# Patient Record
Sex: FEMALE | Race: WHITE | NOT HISPANIC OR LATINO | Employment: UNEMPLOYED | URBAN - NONMETROPOLITAN AREA
[De-identification: names, ages, dates, MRNs, and addresses within clinical notes are randomized per-mention and may not be internally consistent; named-entity substitution may affect disease eponyms.]

---
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
## 2023-02-13 ENCOUNTER — TELEPHONE (OUTPATIENT)
Dept: FAMILY MEDICINE CLINIC | Facility: CLINIC | Age: 18
End: 2023-02-13
Payer: COMMERCIAL

## 2023-02-13 NOTE — TELEPHONE ENCOUNTER
Contacted the patients grandmother per verbal release form and informed her that the patient is in need of an appointment and is due for fasting labs. She stated understanding and thanked me. Appointment has been scheduled. Lab orders extended.

## 2023-02-22 ENCOUNTER — OFFICE VISIT (OUTPATIENT)
Dept: FAMILY MEDICINE CLINIC | Facility: CLINIC | Age: 18
End: 2023-02-22
Payer: COMMERCIAL

## 2023-02-22 VITALS
WEIGHT: 252.8 LBS | DIASTOLIC BLOOD PRESSURE: 80 MMHG | BODY MASS INDEX: 50.96 KG/M2 | HEART RATE: 104 BPM | HEIGHT: 59 IN | OXYGEN SATURATION: 97 % | TEMPERATURE: 97.6 F | SYSTOLIC BLOOD PRESSURE: 122 MMHG

## 2023-02-22 DIAGNOSIS — Z11.59 NEED FOR HEPATITIS C SCREENING TEST: ICD-10-CM

## 2023-02-22 DIAGNOSIS — G44.219 EPISODIC TENSION-TYPE HEADACHE, NOT INTRACTABLE: Primary | Chronic | ICD-10-CM

## 2023-02-22 DIAGNOSIS — R11.0 NAUSEA: ICD-10-CM

## 2023-02-22 DIAGNOSIS — E66.01 CLASS 3 SEVERE OBESITY DUE TO EXCESS CALORIES WITHOUT SERIOUS COMORBIDITY WITH BODY MASS INDEX (BMI) OF 50.0 TO 59.9 IN ADULT: Chronic | ICD-10-CM

## 2023-02-22 DIAGNOSIS — R10.11 RUQ PAIN: ICD-10-CM

## 2023-02-22 DIAGNOSIS — K21.9 GASTROESOPHAGEAL REFLUX DISEASE, UNSPECIFIED WHETHER ESOPHAGITIS PRESENT: Chronic | ICD-10-CM

## 2023-02-22 PROCEDURE — 99214 OFFICE O/P EST MOD 30 MIN: CPT | Performed by: NURSE PRACTITIONER

## 2023-02-22 NOTE — PROGRESS NOTES
"Chief Complaint  Labs Only (Follow up on labs. Patient has not had labs drawn yet.)    Subjective        Kathy Urban presents to [facility] [place]  History of Present Illness  Pt here today with family member for recheck of migraines, gerd. States these are well controlled with medicine. States she takes flexeril and ibuprofen prn headache which do help. Reports she is having headaches about 3 times per week or less. States she is not currently working or going to school therefore she is able to rest in a dark room when she does have a headache which also helps. She reports she takes protonix prn which also helps when she takes it. She reports she does not need on a regular basis however. She is stating that her headaches are no different or worse than they always are. She is reporting RUQ pain and nausea after eating. This has been going on for quite a while. Has had gallbladder US for similar symptoms in the past , this was in 2019 and results are reviewed. Denies changes in bowels, denies vomiting.     Objective   Vital Signs:  /80   Pulse 104   Temp 97.6 °F (36.4 °C)   Ht 149.9 cm (59\")   Wt 115 kg (252 lb 12.8 oz)   SpO2 97%   BMI 51.06 kg/m²   Estimated body mass index is 51.06 kg/m² as calculated from the following:    Height as of this encounter: 149.9 cm (59\").    Weight as of this encounter: 115 kg (252 lb 12.8 oz).  >99 %ile (Z= 2.46) based on CDC (Girls, 2-20 Years) BMI-for-age based on BMI available as of 2/22/2023.    Class 3 Severe Obesity (BMI >=40). Obesity-related health conditions include the following: none. Obesity is worsening. BMI is is above average; BMI management plan is completed. We discussed portion control and increasing exercise.    Physical Exam  Vitals and nursing note reviewed.   Constitutional:       General: She is not in acute distress.     Appearance: Normal appearance. She is not ill-appearing, toxic-appearing or " diaphoretic.   HENT:      Head: Normocephalic and atraumatic.   Cardiovascular:      Rate and Rhythm: Normal rate and regular rhythm.      Heart sounds: Normal heart sounds. No murmur heard.    No friction rub. No gallop.   Pulmonary:      Effort: Pulmonary effort is normal. No respiratory distress.      Breath sounds: Normal breath sounds. No stridor. No wheezing, rhonchi or rales.   Chest:      Chest wall: No tenderness.   Abdominal:      General: Bowel sounds are normal. There is no distension.      Palpations: Abdomen is soft. There is no mass.      Tenderness: There is abdominal tenderness. There is no guarding.      Hernia: No hernia is present.      Comments: Rounded with obesity, exam limited due to pt habitus, slightly tender to palpation throughout but mainly to RUQ   Skin:     General: Skin is warm and dry.      Coloration: Skin is not jaundiced or pale.      Findings: No bruising, erythema, lesion or rash.   Neurological:      Mental Status: She is alert and oriented to person, place, and time.      Cranial Nerves: No cranial nerve deficit.      Motor: No weakness.      Coordination: Coordination normal.      Gait: Gait normal.   Psychiatric:         Mood and Affect: Mood normal.         Behavior: Behavior normal.         Thought Content: Thought content normal.         Judgment: Judgment normal.        Result Review :    CMP    CMP 7/27/22   Glucose 169 (A)   BUN 8   Creatinine 0.66   eGFR 130.6   Sodium 139   Potassium 4.0   Chloride 108   Calcium 9.0   Total Protein 7.5   Albumin 4.20   Globulin 3.3   Total Bilirubin 0.3   Alkaline Phosphatase 62   AST (SGOT) 36   ALT (SGPT) 38 (A)   Albumin/Globulin Ratio 1.3   BUN/Creatinine Ratio 12.1   Anion Gap 10.0   (A) Abnormal value       Comments are available for some flowsheets but are not being displayed.           CBC    CBC 7/27/22   WBC 7.94   RBC 4.75   Hemoglobin 13.7   Hematocrit 41.6   MCV 87.6   MCH 28.8   MCHC 32.9   RDW 13.5   Platelets 321            CBC w/diff    CBC w/Diff 7/27/22   WBC 7.94   RBC 4.75   Hemoglobin 13.7   Hematocrit 41.6   MCV 87.6   MCH 28.8   MCHC 32.9   RDW 13.5   Platelets 321   Neutrophil Rel % 51.1   Lymphocyte Rel % 34.3   Monocyte Rel % 6.3   Eosinophil Rel % 7.7 (A)   Basophil Rel % 0.6   (A) Abnormal value            TSH    TSH 7/27/22   TSH 2.250                    Assessment and Plan   Diagnoses and all orders for this visit:    1. Episodic tension-type headache, not intractable (Primary)    2. Need for hepatitis C screening test  -     Hepatitis C Antibody; Future    3. RUQ pain  -     US Gallbladder    4. Nausea  -     US Gallbladder    5. Gastroesophageal reflux disease, unspecified whether esophagitis present    6. Class 3 severe obesity due to excess calories without serious comorbidity with body mass index (BMI) of 50.0 to 59.9 in adult (HCC)    She will continue on Flexeril ibuprofen and Protonix as prescribed previously, I will obtain hepatitis C antibody since she is due for this with her next set of labs.  Labs have already been ordered for her and she is advised that she can have these done when fasting and will be informed of results.  I will also obtain gallbladder ultrasound on her, inform of results via phone, refer to general surgery as indicated depending on above test results.  She will otherwise return in 6 months for recheck, as needed otherwise.  All questions and concerns addressed with understanding verbalized.  They are aware and in agreement to this plan.  She does not need a work or school excuse today.     I spent 30 minutes caring for Kathy on this date of service. This time includes time spent by me in the following activities:preparing for the visit, performing a medically appropriate examination and/or evaluation , counseling and educating the patient/family/caregiver, ordering medications, tests, or procedures and documenting information in the medical record  Follow Up   Return in about 6  months (around 8/22/2023), or if symptoms worsen or fail to improve, for Recheck, or sooner as needed.  Patient was given instructions and counseling regarding her condition or for health maintenance advice. Please see specific information pulled into the AVS if appropriate.

## 2023-02-27 ENCOUNTER — LAB (OUTPATIENT)
Dept: LAB | Facility: OTHER | Age: 18
End: 2023-02-27
Payer: COMMERCIAL

## 2023-02-27 DIAGNOSIS — E78.00 ELEVATED CHOLESTEROL: ICD-10-CM

## 2023-02-27 DIAGNOSIS — R53.83 FATIGUE, UNSPECIFIED TYPE: ICD-10-CM

## 2023-02-27 DIAGNOSIS — R73.09 ELEVATED GLUCOSE: ICD-10-CM

## 2023-02-27 DIAGNOSIS — Z11.59 NEED FOR HEPATITIS C SCREENING TEST: ICD-10-CM

## 2023-02-27 DIAGNOSIS — E61.1 LOW IRON: ICD-10-CM

## 2023-02-27 LAB
ALBUMIN SERPL-MCNC: 4.1 G/DL (ref 3.5–5)
ALBUMIN/GLOB SERPL: 1.1 G/DL (ref 1.1–1.8)
ALP SERPL-CCNC: 53 U/L (ref 38–126)
ALT SERPL W P-5'-P-CCNC: 42 U/L
ANION GAP SERPL CALCULATED.3IONS-SCNC: 7 MMOL/L (ref 5–15)
AST SERPL-CCNC: 33 U/L (ref 14–36)
BILIRUB SERPL-MCNC: 0.5 MG/DL (ref 0.2–1.3)
BUN SERPL-MCNC: 11 MG/DL (ref 7–23)
BUN/CREAT SERPL: 16.9 (ref 7–25)
CALCIUM SPEC-SCNC: 9 MG/DL (ref 8.4–10.2)
CHLORIDE SERPL-SCNC: 104 MMOL/L (ref 101–112)
CHOLEST SERPL-MCNC: 189 MG/DL (ref 150–200)
CO2 SERPL-SCNC: 26 MMOL/L (ref 22–30)
CREAT SERPL-MCNC: 0.65 MG/DL (ref 0.52–1.04)
EGFRCR SERPLBLD CKD-EPI 2021: 130.3 ML/MIN/1.73
GLOBULIN UR ELPH-MCNC: 3.7 GM/DL (ref 2.3–3.5)
GLUCOSE SERPL-MCNC: 97 MG/DL (ref 70–99)
HBA1C MFR BLD: 6.2 % (ref 4.8–5.6)
HCV AB SER DONR QL: NORMAL
HDLC SERPL-MCNC: 44 MG/DL (ref 40–59)
IRON 24H UR-MRATE: 61 MCG/DL (ref 37–145)
IRON SATN MFR SERPL: 14 % (ref 20–50)
LDLC SERPL CALC-MCNC: 103 MG/DL
LDLC/HDLC SERPL: 2.17 {RATIO} (ref 0–3.22)
POTASSIUM SERPL-SCNC: 3.9 MMOL/L (ref 3.4–5)
PROT SERPL-MCNC: 7.8 G/DL (ref 6.3–8.6)
SODIUM SERPL-SCNC: 137 MMOL/L (ref 137–145)
TIBC SERPL-MCNC: 440 MCG/DL (ref 298–536)
TRANSFERRIN SERPL-MCNC: 295 MG/DL (ref 200–360)
TRIGL SERPL-MCNC: 247 MG/DL
VIT B12 BLD-MCNC: 360 PG/ML (ref 211–946)
VLDLC SERPL-MCNC: 42 MG/DL (ref 5–40)

## 2023-02-27 PROCEDURE — 82607 VITAMIN B-12: CPT | Performed by: NURSE PRACTITIONER

## 2023-02-27 PROCEDURE — 80061 LIPID PANEL: CPT | Performed by: NURSE PRACTITIONER

## 2023-02-27 PROCEDURE — 86803 HEPATITIS C AB TEST: CPT | Performed by: NURSE PRACTITIONER

## 2023-02-27 PROCEDURE — 83525 ASSAY OF INSULIN: CPT | Performed by: NURSE PRACTITIONER

## 2023-02-27 PROCEDURE — 83036 HEMOGLOBIN GLYCOSYLATED A1C: CPT | Performed by: NURSE PRACTITIONER

## 2023-02-27 PROCEDURE — 80053 COMPREHEN METABOLIC PANEL: CPT | Performed by: NURSE PRACTITIONER

## 2023-02-27 PROCEDURE — 83540 ASSAY OF IRON: CPT | Performed by: NURSE PRACTITIONER

## 2023-02-27 PROCEDURE — 84466 ASSAY OF TRANSFERRIN: CPT | Performed by: NURSE PRACTITIONER

## 2023-02-28 LAB — INSULIN SERPL-ACNC: 52.7 UIU/ML (ref 2.6–24.9)

## 2023-03-06 DIAGNOSIS — R11.0 NAUSEA: ICD-10-CM

## 2023-03-06 DIAGNOSIS — R10.11 RUQ PAIN: Primary | ICD-10-CM

## 2023-04-20 ENCOUNTER — TELEPHONE (OUTPATIENT)
Dept: FAMILY MEDICINE CLINIC | Facility: CLINIC | Age: 18
End: 2023-04-20
Payer: COMMERCIAL

## 2023-04-20 DIAGNOSIS — R10.11 RUQ PAIN: Primary | ICD-10-CM

## 2023-04-20 NOTE — TELEPHONE ENCOUNTER
Informed patient of HIDA scan results. Patient states understanding, she is still having abdominal pain and wants to be referred to surgeon for further evaluation and also scheduled to see reny next week.

## 2023-04-21 DIAGNOSIS — R10.11 RUQ PAIN: ICD-10-CM

## 2023-04-21 DIAGNOSIS — R11.0 NAUSEA: ICD-10-CM

## 2023-04-24 ENCOUNTER — OFFICE VISIT (OUTPATIENT)
Dept: FAMILY MEDICINE CLINIC | Facility: CLINIC | Age: 18
End: 2023-04-24
Payer: COMMERCIAL

## 2023-04-24 VITALS
BODY MASS INDEX: 50.16 KG/M2 | HEART RATE: 131 BPM | DIASTOLIC BLOOD PRESSURE: 74 MMHG | OXYGEN SATURATION: 98 % | HEIGHT: 59 IN | SYSTOLIC BLOOD PRESSURE: 130 MMHG | WEIGHT: 248.8 LBS | TEMPERATURE: 98 F

## 2023-04-24 DIAGNOSIS — R11.2 NAUSEA AND VOMITING, UNSPECIFIED VOMITING TYPE: ICD-10-CM

## 2023-04-24 DIAGNOSIS — R10.11 RUQ PAIN: Primary | ICD-10-CM

## 2023-04-24 PROCEDURE — 1160F RVW MEDS BY RX/DR IN RCRD: CPT | Performed by: NURSE PRACTITIONER

## 2023-04-24 PROCEDURE — 1159F MED LIST DOCD IN RCRD: CPT | Performed by: NURSE PRACTITIONER

## 2023-04-24 PROCEDURE — 99213 OFFICE O/P EST LOW 20 MIN: CPT | Performed by: NURSE PRACTITIONER

## 2023-04-24 NOTE — PROGRESS NOTES
"Chief Complaint  Abdominal Pain (Follow up on stomach pains)    Subjective        Kathy Urban presents to [facility] [place]  History of Present Illness  Patient here today again with complaints of stomach pain sharp and stomach cramps mainly after she eats she states that she will have diarrhea decreased appetite nausea and vomiting.  Nausea and vomiting are constant diarrhea and decreased appetite worse after eating.  Symptoms are becoming more regular.  She has had ultrasound of gallbladder and HIDA scan and is awaiting appointment with surgeon now.  Unfortunately she has not yet received appointment date and time but we are going to check on that today.  Reports menses are regular and she is not sexually active.  Objective   Vital Signs:  /74   Pulse (!) 131   Temp 98 °F (36.7 °C)   Ht 149.9 cm (59\")   Wt 113 kg (248 lb 12.8 oz)   SpO2 98%   BMI 50.25 kg/m²   Estimated body mass index is 50.25 kg/m² as calculated from the following:    Height as of this encounter: 149.9 cm (59\").    Weight as of this encounter: 113 kg (248 lb 12.8 oz).  >99 %ile (Z= 2.44) based on CDC (Girls, 2-20 Years) BMI-for-age based on BMI available as of 4/24/2023.    Class 3 Severe Obesity (BMI >=40). Obesity-related health conditions include the following: none. Obesity is improving with lifestyle modifications. BMI is is above average; BMI management plan is completed. We discussed low calorie, low carb based diet program, portion control and increasing exercise.    Physical Exam  Vitals and nursing note reviewed.   Constitutional:       General: She is not in acute distress.     Appearance: Normal appearance. She is obese. She is not ill-appearing, toxic-appearing or diaphoretic.   HENT:      Head: Normocephalic and atraumatic.   Cardiovascular:      Rate and Rhythm: Normal rate and regular rhythm.      Heart sounds: Normal heart sounds. No murmur heard.    No friction rub. " No gallop.   Pulmonary:      Effort: Pulmonary effort is normal. No respiratory distress.      Breath sounds: Normal breath sounds. No stridor. No wheezing, rhonchi or rales.   Abdominal:      General: Bowel sounds are normal. There is no distension.      Palpations: Abdomen is soft. There is no mass.      Tenderness: There is abdominal tenderness in the right upper quadrant. There is no guarding.      Hernia: No hernia is present.      Comments: abd rounded with obesity , exam limited due to pt habitus    Musculoskeletal:      Cervical back: Neck supple.   Skin:     General: Skin is warm and dry.      Coloration: Skin is not jaundiced or pale.      Findings: No bruising, erythema, lesion or rash.   Neurological:      Mental Status: She is alert and oriented to person, place, and time.      Cranial Nerves: No cranial nerve deficit.      Motor: No weakness.      Coordination: Coordination normal.      Gait: Gait normal.   Psychiatric:         Mood and Affect: Mood normal.         Behavior: Behavior normal.         Thought Content: Thought content normal.         Judgment: Judgment normal.        Result Review :      Data reviewed: Radiologic studies hida scan results reviewed today from 4-18-23         Assessment and Plan   Diagnoses and all orders for this visit:    1. RUQ pain (Primary)    2. Nausea and vomiting, unspecified vomiting type    Gallbladder ultrasound results and HIDA scan results are reviewed with her today.  Staff has contacted surgeons office and appointment made for Wednesday.  Patient aware.  If her symptoms should persist or worsen then she will call me back and let me know otherwise she will see surgeon as scheduled for consideration of cholecystectomy as he deems necessary.  All questions and concerns addressed with understand verbalized.  Patient aware and in agreement to above plan.     I spent 20 minutes caring for Kathy on this date of service. This time includes time spent by me in the  following activities:preparing for the visit, reviewing tests, performing a medically appropriate examination and/or evaluation , counseling and educating the patient/family/caregiver, referring and communicating with other health care professionals  and documenting information in the medical record  Follow Up   Return if symptoms worsen or fail to improve, for or sooner as needed, Recheck.  Patient was given instructions and counseling regarding her condition or for health maintenance advice. Please see specific information pulled into the AVS if appropriate.

## 2023-04-26 ENCOUNTER — OFFICE VISIT (OUTPATIENT)
Dept: SURGERY | Facility: CLINIC | Age: 18
End: 2023-04-26
Payer: COMMERCIAL

## 2023-04-26 VITALS
HEIGHT: 59 IN | DIASTOLIC BLOOD PRESSURE: 84 MMHG | WEIGHT: 250 LBS | HEART RATE: 98 BPM | SYSTOLIC BLOOD PRESSURE: 128 MMHG | TEMPERATURE: 97.1 F | BODY MASS INDEX: 50.4 KG/M2

## 2023-04-26 DIAGNOSIS — R19.7 DIARRHEA, UNSPECIFIED TYPE: Primary | ICD-10-CM

## 2023-04-26 PROCEDURE — 99204 OFFICE O/P NEW MOD 45 MIN: CPT | Performed by: SURGERY

## 2023-04-26 PROCEDURE — 1160F RVW MEDS BY RX/DR IN RCRD: CPT | Performed by: SURGERY

## 2023-04-26 PROCEDURE — 1159F MED LIST DOCD IN RCRD: CPT | Performed by: SURGERY
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
## 2023-05-01 DIAGNOSIS — R51.9 NONINTRACTABLE HEADACHE, UNSPECIFIED CHRONICITY PATTERN, UNSPECIFIED HEADACHE TYPE: ICD-10-CM

## 2023-05-01 DIAGNOSIS — G44.219 EPISODIC TENSION-TYPE HEADACHE, NOT INTRACTABLE: ICD-10-CM

## 2023-05-01 RX ORDER — CYCLOBENZAPRINE HCL 10 MG
10 TABLET ORAL NIGHTLY PRN
Qty: 30 TABLET | Refills: 0 | Status: SHIPPED | OUTPATIENT
Start: 2023-05-01

## 2023-05-12 ENCOUNTER — OFFICE VISIT (OUTPATIENT)
Dept: GASTROENTEROLOGY | Facility: CLINIC | Age: 18
End: 2023-05-12
Payer: COMMERCIAL

## 2023-05-12 VITALS
BODY MASS INDEX: 50.12 KG/M2 | HEART RATE: 122 BPM | SYSTOLIC BLOOD PRESSURE: 120 MMHG | HEIGHT: 59 IN | WEIGHT: 248.6 LBS | DIASTOLIC BLOOD PRESSURE: 78 MMHG

## 2023-05-12 DIAGNOSIS — K76.0 FATTY LIVER: ICD-10-CM

## 2023-05-12 DIAGNOSIS — R10.11 POSTPRANDIAL RUQ PAIN: ICD-10-CM

## 2023-05-12 DIAGNOSIS — R19.7 DIARRHEA, UNSPECIFIED TYPE: ICD-10-CM

## 2023-05-12 DIAGNOSIS — K21.9 GASTROESOPHAGEAL REFLUX DISEASE, UNSPECIFIED WHETHER ESOPHAGITIS PRESENT: Primary | ICD-10-CM

## 2023-05-12 RX ORDER — PANTOPRAZOLE SODIUM 40 MG/1
40 TABLET, DELAYED RELEASE ORAL DAILY
Qty: 30 TABLET | Refills: 5 | Status: SHIPPED | OUTPATIENT
Start: 2023-05-12

## 2023-05-12 RX ORDER — LISDEXAMFETAMINE DIMESYLATE 40 MG/1
40 CAPSULE ORAL EVERY MORNING
COMMUNITY
Start: 2023-05-01
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
## 2023-05-15 DIAGNOSIS — G44.219 EPISODIC TENSION-TYPE HEADACHE, NOT INTRACTABLE: ICD-10-CM

## 2023-05-15 DIAGNOSIS — R51.9 NONINTRACTABLE HEADACHE, UNSPECIFIED CHRONICITY PATTERN, UNSPECIFIED HEADACHE TYPE: ICD-10-CM

## 2023-05-16 RX ORDER — IBUPROFEN 600 MG/1
600 TABLET ORAL EVERY 8 HOURS PRN
Qty: 60 TABLET | Refills: 0 | Status: SHIPPED | OUTPATIENT
Start: 2023-05-16

## 2023-05-17 ENCOUNTER — OFFICE VISIT (OUTPATIENT)
Dept: SURGERY | Facility: CLINIC | Age: 18
End: 2023-05-17
Payer: COMMERCIAL

## 2023-05-17 ENCOUNTER — PRE-ADMISSION TESTING (OUTPATIENT)
Dept: PREADMISSION TESTING | Facility: HOSPITAL | Age: 18
End: 2023-05-17
Payer: COMMERCIAL

## 2023-05-17 VITALS
BODY MASS INDEX: 50.6 KG/M2 | TEMPERATURE: 96.6 F | HEIGHT: 59 IN | HEART RATE: 128 BPM | SYSTOLIC BLOOD PRESSURE: 122 MMHG | WEIGHT: 251 LBS | DIASTOLIC BLOOD PRESSURE: 78 MMHG

## 2023-05-17 DIAGNOSIS — K81.9 CHOLECYSTITIS: Primary | ICD-10-CM

## 2023-05-17 LAB
ANION GAP SERPL CALCULATED.3IONS-SCNC: 13 MMOL/L (ref 5–15)
B-HCG UR QL: NEGATIVE
BUN SERPL-MCNC: 13 MG/DL (ref 6–20)
BUN/CREAT SERPL: 17.3 (ref 7–25)
CALCIUM SPEC-SCNC: 9.1 MG/DL (ref 8.6–10.5)
CHLORIDE SERPL-SCNC: 102 MMOL/L (ref 98–107)
CO2 SERPL-SCNC: 21 MMOL/L (ref 22–29)
CREAT SERPL-MCNC: 0.75 MG/DL (ref 0.57–1)
EGFRCR SERPLBLD CKD-EPI 2021: 117.8 ML/MIN/1.73
GLUCOSE SERPL-MCNC: 158 MG/DL (ref 65–99)
POTASSIUM SERPL-SCNC: 3.9 MMOL/L (ref 3.5–5.2)
QT INTERVAL: 300 MS
QTC INTERVAL: 409 MS
SODIUM SERPL-SCNC: 136 MMOL/L (ref 136–145)

## 2023-05-17 PROCEDURE — 93005 ELECTROCARDIOGRAM TRACING: CPT

## 2023-05-17 PROCEDURE — 81025 URINE PREGNANCY TEST: CPT

## 2023-05-17 PROCEDURE — 80048 BASIC METABOLIC PNL TOTAL CA: CPT

## 2023-05-17 PROCEDURE — 36415 COLL VENOUS BLD VENIPUNCTURE: CPT

## 2023-05-17 RX ORDER — SODIUM CHLORIDE 0.9 % (FLUSH) 0.9 %
10 SYRINGE (ML) INJECTION EVERY 12 HOURS SCHEDULED
Status: CANCELLED | OUTPATIENT
Start: 2023-05-18

## 2023-05-17 RX ORDER — SODIUM CHLORIDE 9 MG/ML
40 INJECTION, SOLUTION INTRAVENOUS AS NEEDED
Status: CANCELLED | OUTPATIENT
Start: 2023-05-18

## 2023-05-17 RX ORDER — SODIUM CHLORIDE 9 MG/ML
1000 INJECTION, SOLUTION INTRAVENOUS CONTINUOUS
Status: CANCELLED | OUTPATIENT
Start: 2023-05-18

## 2023-05-17 RX ORDER — BUPIVACAINE HCL/0.9 % NACL/PF 0.1 %
2 PLASTIC BAG, INJECTION (ML) EPIDURAL ONCE
Status: CANCELLED | OUTPATIENT
Start: 2023-05-18 | End: 2023-05-17

## 2023-05-17 RX ORDER — SODIUM CHLORIDE 0.9 % (FLUSH) 0.9 %
10 SYRINGE (ML) INJECTION AS NEEDED
Status: CANCELLED | OUTPATIENT
Start: 2023-05-18

## 2023-05-17 RX ORDER — INDOCYANINE GREEN AND WATER 25 MG
5 KIT INJECTION ONCE
Status: CANCELLED | OUTPATIENT
Start: 2023-05-18 | End: 2023-05-17
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
## 2023-05-18 ENCOUNTER — HOSPITAL ENCOUNTER (OUTPATIENT)
Facility: HOSPITAL | Age: 18
Setting detail: HOSPITAL OUTPATIENT SURGERY
Discharge: HOME | End: 2023-05-18
Attending: SURGERY | Admitting: SURGERY
Payer: COMMERCIAL

## 2023-05-18 ENCOUNTER — ANESTHESIA (OUTPATIENT)
Dept: PERIOP | Facility: HOSPITAL | Age: 18
End: 2023-05-18
Payer: COMMERCIAL

## 2023-05-18 ENCOUNTER — ANESTHESIA EVENT (OUTPATIENT)
Dept: PERIOP | Facility: HOSPITAL | Age: 18
End: 2023-05-18
Payer: COMMERCIAL

## 2023-05-18 VITALS
SYSTOLIC BLOOD PRESSURE: 119 MMHG | RESPIRATION RATE: 20 BRPM | BODY MASS INDEX: 49.43 KG/M2 | HEART RATE: 109 BPM | WEIGHT: 251.77 LBS | DIASTOLIC BLOOD PRESSURE: 69 MMHG | TEMPERATURE: 96.9 F | HEIGHT: 60 IN | OXYGEN SATURATION: 100 %

## 2023-05-18 DIAGNOSIS — K81.9 CHOLECYSTITIS: ICD-10-CM

## 2023-05-18 LAB — GLUCOSE BLDC GLUCOMTR-MCNC: 120 MG/DL (ref 70–130)

## 2023-05-18 PROCEDURE — 25010000002 HYDROMORPHONE 1 MG/ML SOLUTION: Performed by: NURSE ANESTHETIST, CERTIFIED REGISTERED

## 2023-05-18 PROCEDURE — 25010000002 MIDAZOLAM PER 1 MG: Performed by: NURSE ANESTHETIST, CERTIFIED REGISTERED

## 2023-05-18 PROCEDURE — 25010000002 INDOCYANINE GREEN 25 MG RECONSTITUTED SOLUTION: Performed by: SURGERY

## 2023-05-18 PROCEDURE — 25010000002 DEXAMETHASONE PER 1 MG: Performed by: NURSE ANESTHETIST, CERTIFIED REGISTERED

## 2023-05-18 PROCEDURE — 25010000002 SUCCINYLCHOLINE PER 20 MG: Performed by: NURSE ANESTHETIST, CERTIFIED REGISTERED

## 2023-05-18 PROCEDURE — 25010000002 FENTANYL CITRATE (PF) 100 MCG/2ML SOLUTION: Performed by: NURSE ANESTHETIST, CERTIFIED REGISTERED

## 2023-05-18 PROCEDURE — 25010000002 ONDANSETRON PER 1 MG: Performed by: NURSE ANESTHETIST, CERTIFIED REGISTERED

## 2023-05-18 PROCEDURE — 94640 AIRWAY INHALATION TREATMENT: CPT

## 2023-05-18 PROCEDURE — 25010000002 PROPOFOL 200 MG/20ML EMULSION: Performed by: NURSE ANESTHETIST, CERTIFIED REGISTERED

## 2023-05-18 PROCEDURE — 25010000002 KETOROLAC TROMETHAMINE PER 15 MG: Performed by: NURSE ANESTHETIST, CERTIFIED REGISTERED

## 2023-05-18 PROCEDURE — 47562 LAPAROSCOPIC CHOLECYSTECTOMY: CPT | Performed by: SURGERY

## 2023-05-18 PROCEDURE — 82948 REAGENT STRIP/BLOOD GLUCOSE: CPT

## 2023-05-18 PROCEDURE — S2900 ROBOTIC SURGICAL SYSTEM: HCPCS | Performed by: SURGERY

## 2023-05-18 PROCEDURE — 25010000002 CEFAZOLIN PER 500 MG: Performed by: SURGERY

## 2023-05-18 DEVICE — CLIP LIG HEMOLOK PA LG 6CT PRP: Type: IMPLANTABLE DEVICE | Site: ABDOMEN | Status: FUNCTIONAL

## 2023-05-18 RX ORDER — EPHEDRINE SULFATE 50 MG/ML
5 INJECTION, SOLUTION INTRAVENOUS ONCE AS NEEDED
Status: DISCONTINUED | OUTPATIENT
Start: 2023-05-18 | End: 2023-05-18 | Stop reason: HOSPADM

## 2023-05-18 RX ORDER — ONDANSETRON 2 MG/ML
4 INJECTION INTRAMUSCULAR; INTRAVENOUS ONCE AS NEEDED
Status: DISCONTINUED | OUTPATIENT
Start: 2023-05-18 | End: 2023-05-18 | Stop reason: HOSPADM

## 2023-05-18 RX ORDER — NALOXONE HCL 0.4 MG/ML
0.4 VIAL (ML) INJECTION AS NEEDED
Status: DISCONTINUED | OUTPATIENT
Start: 2023-05-18 | End: 2023-05-18 | Stop reason: HOSPADM

## 2023-05-18 RX ORDER — ACETAMINOPHEN 325 MG/1
650 TABLET ORAL ONCE AS NEEDED
Status: DISCONTINUED | OUTPATIENT
Start: 2023-05-18 | End: 2023-05-18 | Stop reason: HOSPADM

## 2023-05-18 RX ORDER — ROCURONIUM BROMIDE 10 MG/ML
INJECTION, SOLUTION INTRAVENOUS AS NEEDED
Status: DISCONTINUED | OUTPATIENT
Start: 2023-05-18 | End: 2023-05-18 | Stop reason: SURG

## 2023-05-18 RX ORDER — BUPIVACAINE HYDROCHLORIDE AND EPINEPHRINE 2.5; 5 MG/ML; UG/ML
INJECTION, SOLUTION EPIDURAL; INFILTRATION; INTRACAUDAL; PERINEURAL AS NEEDED
Status: DISCONTINUED | OUTPATIENT
Start: 2023-05-18 | End: 2023-05-18 | Stop reason: HOSPADM

## 2023-05-18 RX ORDER — PROMETHAZINE HYDROCHLORIDE 25 MG/1
25 SUPPOSITORY RECTAL ONCE AS NEEDED
Status: DISCONTINUED | OUTPATIENT
Start: 2023-05-18 | End: 2023-05-18 | Stop reason: HOSPADM

## 2023-05-18 RX ORDER — DEXAMETHASONE SODIUM PHOSPHATE 4 MG/ML
INJECTION, SOLUTION INTRA-ARTICULAR; INTRALESIONAL; INTRAMUSCULAR; INTRAVENOUS; SOFT TISSUE AS NEEDED
Status: DISCONTINUED | OUTPATIENT
Start: 2023-05-18 | End: 2023-05-18 | Stop reason: SURG

## 2023-05-18 RX ORDER — LIDOCAINE HYDROCHLORIDE 10 MG/ML
INJECTION, SOLUTION EPIDURAL; INFILTRATION; INTRACAUDAL; PERINEURAL AS NEEDED
Status: DISCONTINUED | OUTPATIENT
Start: 2023-05-18 | End: 2023-05-18 | Stop reason: SURG

## 2023-05-18 RX ORDER — DIPHENHYDRAMINE HYDROCHLORIDE 50 MG/ML
12.5 INJECTION INTRAMUSCULAR; INTRAVENOUS
Status: DISCONTINUED | OUTPATIENT
Start: 2023-05-18 | End: 2023-05-18 | Stop reason: HOSPADM

## 2023-05-18 RX ORDER — ALBUTEROL SULFATE 2.5 MG/3ML
2.5 SOLUTION RESPIRATORY (INHALATION) ONCE
Status: COMPLETED | OUTPATIENT
Start: 2023-05-18 | End: 2023-05-18

## 2023-05-18 RX ORDER — FLUMAZENIL 0.1 MG/ML
0.2 INJECTION INTRAVENOUS AS NEEDED
Status: DISCONTINUED | OUTPATIENT
Start: 2023-05-18 | End: 2023-05-18 | Stop reason: HOSPADM

## 2023-05-18 RX ORDER — PROMETHAZINE HYDROCHLORIDE 25 MG/1
25 TABLET ORAL ONCE AS NEEDED
Status: DISCONTINUED | OUTPATIENT
Start: 2023-05-18 | End: 2023-05-18 | Stop reason: HOSPADM

## 2023-05-18 RX ORDER — SODIUM CHLORIDE 9 MG/ML
40 INJECTION, SOLUTION INTRAVENOUS AS NEEDED
Status: DISCONTINUED | OUTPATIENT
Start: 2023-05-18 | End: 2023-05-18 | Stop reason: HOSPADM

## 2023-05-18 RX ORDER — SODIUM CHLORIDE 0.9 % (FLUSH) 0.9 %
10 SYRINGE (ML) INJECTION EVERY 12 HOURS SCHEDULED
Status: DISCONTINUED | OUTPATIENT
Start: 2023-05-18 | End: 2023-05-18 | Stop reason: HOSPADM

## 2023-05-18 RX ORDER — PROPOFOL 10 MG/ML
INJECTION, EMULSION INTRAVENOUS AS NEEDED
Status: DISCONTINUED | OUTPATIENT
Start: 2023-05-18 | End: 2023-05-18 | Stop reason: SURG

## 2023-05-18 RX ORDER — SODIUM CHLORIDE 9 MG/ML
1000 INJECTION, SOLUTION INTRAVENOUS CONTINUOUS
Status: DISCONTINUED | OUTPATIENT
Start: 2023-05-18 | End: 2023-05-18 | Stop reason: HOSPADM

## 2023-05-18 RX ORDER — BUPIVACAINE HCL/0.9 % NACL/PF 0.1 %
2 PLASTIC BAG, INJECTION (ML) EPIDURAL ONCE
Status: COMPLETED | OUTPATIENT
Start: 2023-05-18 | End: 2023-05-18

## 2023-05-18 RX ORDER — ONDANSETRON 2 MG/ML
INJECTION INTRAMUSCULAR; INTRAVENOUS AS NEEDED
Status: DISCONTINUED | OUTPATIENT
Start: 2023-05-18 | End: 2023-05-18 | Stop reason: SURG

## 2023-05-18 RX ORDER — MIDAZOLAM HYDROCHLORIDE 1 MG/ML
INJECTION INTRAMUSCULAR; INTRAVENOUS AS NEEDED
Status: DISCONTINUED | OUTPATIENT
Start: 2023-05-18 | End: 2023-05-18 | Stop reason: SURG

## 2023-05-18 RX ORDER — KETOROLAC TROMETHAMINE 30 MG/ML
INJECTION, SOLUTION INTRAMUSCULAR; INTRAVENOUS AS NEEDED
Status: DISCONTINUED | OUTPATIENT
Start: 2023-05-18 | End: 2023-05-18 | Stop reason: SURG

## 2023-05-18 RX ORDER — SODIUM CHLORIDE 0.9 % (FLUSH) 0.9 %
10 SYRINGE (ML) INJECTION AS NEEDED
Status: DISCONTINUED | OUTPATIENT
Start: 2023-05-18 | End: 2023-05-18 | Stop reason: HOSPADM

## 2023-05-18 RX ORDER — ALBUTEROL SULFATE 2.5 MG/3ML
2.5 SOLUTION RESPIRATORY (INHALATION) ONCE AS NEEDED
Status: COMPLETED | OUTPATIENT
Start: 2023-05-18 | End: 2023-05-18

## 2023-05-18 RX ORDER — SUCCINYLCHOLINE CHLORIDE 20 MG/ML
INJECTION INTRAMUSCULAR; INTRAVENOUS AS NEEDED
Status: DISCONTINUED | OUTPATIENT
Start: 2023-05-18 | End: 2023-05-18 | Stop reason: SURG

## 2023-05-18 RX ORDER — INDOCYANINE GREEN AND WATER 25 MG
5 KIT INJECTION ONCE
Status: COMPLETED | OUTPATIENT
Start: 2023-05-18 | End: 2023-05-18

## 2023-05-18 RX ORDER — HYDROCODONE BITARTRATE AND ACETAMINOPHEN 5; 325 MG/1; MG/1
1 TABLET ORAL EVERY 6 HOURS PRN
Qty: 12 TABLET | Refills: 0 | Status: SHIPPED | OUTPATIENT
Start: 2023-05-18

## 2023-05-18 RX ORDER — FENTANYL CITRATE 50 UG/ML
INJECTION, SOLUTION INTRAMUSCULAR; INTRAVENOUS AS NEEDED
Status: DISCONTINUED | OUTPATIENT
Start: 2023-05-18 | End: 2023-05-18 | Stop reason: SURG

## 2023-05-18 RX ORDER — MEPERIDINE HYDROCHLORIDE 50 MG/ML
12.5 INJECTION INTRAMUSCULAR; INTRAVENOUS; SUBCUTANEOUS
Status: DISCONTINUED | OUTPATIENT
Start: 2023-05-18 | End: 2023-05-18 | Stop reason: HOSPADM

## 2023-05-18 RX ORDER — ACETAMINOPHEN 650 MG/1
650 SUPPOSITORY RECTAL ONCE AS NEEDED
Status: DISCONTINUED | OUTPATIENT
Start: 2023-05-18 | End: 2023-05-18 | Stop reason: HOSPADM

## 2023-05-18 RX ADMIN — ONDANSETRON 4 MG: 2 INJECTION INTRAMUSCULAR; INTRAVENOUS at 08:37

## 2023-05-18 RX ADMIN — SUCCINYLCHOLINE CHLORIDE 140 MG: 20 INJECTION, SOLUTION INTRAMUSCULAR; INTRAVENOUS at 07:13

## 2023-05-18 RX ADMIN — ROCURONIUM BROMIDE 5 MG: 50 INJECTION INTRAVENOUS at 07:13

## 2023-05-18 RX ADMIN — DEXAMETHASONE SODIUM PHOSPHATE 8 MG: 4 INJECTION, SOLUTION INTRAMUSCULAR; INTRAVENOUS at 07:30

## 2023-05-18 RX ADMIN — Medication 2 G: at 07:16

## 2023-05-18 RX ADMIN — ROCURONIUM BROMIDE 10 MG: 50 INJECTION INTRAVENOUS at 07:59

## 2023-05-18 RX ADMIN — HYDROMORPHONE HYDROCHLORIDE 0.4 MG: 1 INJECTION, SOLUTION INTRAMUSCULAR; INTRAVENOUS; SUBCUTANEOUS at 07:35

## 2023-05-18 RX ADMIN — ROCURONIUM BROMIDE 45 MG: 50 INJECTION INTRAVENOUS at 07:16

## 2023-05-18 RX ADMIN — ALBUTEROL SULFATE 2.5 MG: 2.5 SOLUTION RESPIRATORY (INHALATION) at 09:33

## 2023-05-18 RX ADMIN — HYDROMORPHONE HYDROCHLORIDE 0.2 MG: 1 INJECTION, SOLUTION INTRAMUSCULAR; INTRAVENOUS; SUBCUTANEOUS at 07:57

## 2023-05-18 RX ADMIN — INDOCYANINE GREEN AND WATER 5 MG: KIT at 06:19

## 2023-05-18 RX ADMIN — SODIUM CHLORIDE 1000 ML: 9 INJECTION, SOLUTION INTRAVENOUS at 06:19

## 2023-05-18 RX ADMIN — HYDROMORPHONE HYDROCHLORIDE 0.2 MG: 1 INJECTION, SOLUTION INTRAMUSCULAR; INTRAVENOUS; SUBCUTANEOUS at 08:32

## 2023-05-18 RX ADMIN — ROCURONIUM BROMIDE 10 MG: 50 INJECTION INTRAVENOUS at 07:38

## 2023-05-18 RX ADMIN — MIDAZOLAM HYDROCHLORIDE 2 MG: 1 INJECTION, SOLUTION INTRAMUSCULAR; INTRAVENOUS at 07:04

## 2023-05-18 RX ADMIN — HYDROMORPHONE HYDROCHLORIDE 0.5 MG: 1 INJECTION, SOLUTION INTRAMUSCULAR; INTRAVENOUS; SUBCUTANEOUS at 09:35

## 2023-05-18 RX ADMIN — ROCURONIUM BROMIDE 10 MG: 50 INJECTION INTRAVENOUS at 08:16

## 2023-05-18 RX ADMIN — KETOROLAC TROMETHAMINE 30 MG: 30 INJECTION, SOLUTION INTRAMUSCULAR; INTRAVENOUS at 08:35

## 2023-05-18 RX ADMIN — ALBUTEROL SULFATE 2.5 MG: 2.5 SOLUTION RESPIRATORY (INHALATION) at 06:31

## 2023-05-18 RX ADMIN — FENTANYL CITRATE 100 MCG: 50 INJECTION, SOLUTION INTRAMUSCULAR; INTRAVENOUS at 07:13

## 2023-05-18 RX ADMIN — HYDROMORPHONE HYDROCHLORIDE 0.2 MG: 1 INJECTION, SOLUTION INTRAMUSCULAR; INTRAVENOUS; SUBCUTANEOUS at 08:09

## 2023-05-18 RX ADMIN — PROPOFOL 200 MG: 10 INJECTION, EMULSION INTRAVENOUS at 07:13

## 2023-05-18 RX ADMIN — LIDOCAINE HYDROCHLORIDE 50 MG: 10 INJECTION, SOLUTION EPIDURAL; INFILTRATION; INTRACAUDAL; PERINEURAL at 07:13

## 2023-05-18 NOTE — INTERVAL H&P NOTE
H&P reviewed. The patient was examined and there are no changes to the H&P.      Temp:  [96.6 °F (35.9 °C)-99 °F (37.2 °C)] 99 °F (37.2 °C)  Heart Rate:  [112-128] 112  Resp:  [20] 20  BP: (111-122)/(69-78) 111/69

## 2023-05-18 NOTE — ANESTHESIA PROCEDURE NOTES
Airway  Urgency: elective    Date/Time: 5/18/2023 7:14 AM  End Time:5/18/2023 7:14 AM  Airway not difficult    General Information and Staff    Patient location during procedure: OR  CRNA/CAA: Ciro Nagel CRNA    Indications and Patient Condition  Indications for airway management: airway protection    Preoxygenated: yes  MILS maintained throughout  Mask difficulty assessment: 1 - vent by mask    Final Airway Details  Final airway type: endotracheal airway      Successful airway: ETT  Cuffed: yes   Successful intubation technique: direct laryngoscopy  Facilitating devices/methods: intubating stylet  Endotracheal tube insertion site: oral  Blade: [place]  Blade size: 2  ETT size (mm): 7.0  Cormack-Lehane Classification: grade I - full view of glottis  Placement verified by: chest auscultation and capnometry   Cuff volume (mL): 4  Measured from: lips  ETT/EBT  to lips (cm): 19  Number of attempts at approach: 1  Assessment: lips, teeth, and gum same as pre-op and atraumatic intubation

## 2023-05-18 NOTE — ANESTHESIA PREPROCEDURE EVALUATION
Anesthesia Evaluation     Patient summary reviewed and Nursing notes reviewed   NPO Solid Status: > 8 hours  NPO Liquid Status: > 2 hours           Airway   Mallampati: II  TM distance: <3 FB  Neck ROM: full  Anterior  Dental    (+) poor dentition    Pulmonary    (+) wheezes,   (-) asthma, shortness of breath, sleep apnea, not a smoker    PE comment: Albuterol tx ordered pre-op  Cardiovascular   Exercise tolerance: good (4-7 METS)    ECG reviewed  Rhythm: regular  Rate: abnormal    (+) dysrhythmias Tachycardia,   (-) hypertension, past MI, angina, BELTRAN, murmur, cardiac stents, DVT    ROS comment: EK  Sinus tachycardia  Otherwise normal ECG  No previous ECGs available    Neuro/Psych  (+) psychiatric history ADHD, Depression and PTSD,    (-) seizures, TIA, CVA  GI/Hepatic/Renal/Endo    (+) morbid obesity, GERD well controlled,  diabetes mellitus type 2,     Musculoskeletal     Abdominal   (+) obese,    Substance History   (-) alcohol use, drug use     OB/GYN    (-)  Pregnant        Other        ROS/Med Hx Other: Hx:GERD: controlled on Protonix     Hx:ADHD: takes vyvanse 40 Mg daily     T2DM: Last HgbA1C: 6.2                Anesthesia Plan    ASA 3     general     (Hcg: negative yesterday   Albuterol tx ordered pre-op)  intravenous induction     Anesthetic plan, risks, benefits, and alternatives have been provided, discussed and informed consent has been obtained with: patient and other.        CODE STATUS:

## 2023-05-18 NOTE — OP NOTE
CHOLECYSTECTOMY LAPAROSCOPIC WITH DAVINCI ROBOT  Procedure Note    Kathy Urban  5/18/2023    Pre-op Diagnosis:   Cholecystitis [K81.9]    Post-op Diagnosis:     Post-Op Diagnosis Codes:     * Cholecystitis [K81.9]    Procedure/CPT® Codes:      Procedure(s):  CHOLECYSTECTOMY LAPAROSCOPIC WITH DAVINCI ROBOT    Surgeon(s):  Jhon Orlando MD    Anesthesia: General    Staff:   Circulator: Lauren Ho RN; Mirela Shea RN  Scrub Person: Aleah Prasad  Assistant: Uzma Lopez CSA    Assistant: Uzma Lopez CSA was responsible for performing the following activities: Retraction, Suction, Irrigation, Suturing, Closing and Placing Dressing and their skilled assistance was necessary for the success of this case.     Estimated Blood Loss: minimal    Specimens:                ID Type Source Tests Collected by Time   A (Not marked as sent) : GALLBLADDER AND CONTENTS Tissue Gallbladder TISSUE EXAM, [facility]) Jhon Orlando MD 5/18/2023 0739         Drains: * No LDAs found *    Indications: 17-year-old female presents with intermittent epigastric pain after meals.  Worse with fatty meals.  Ultrasound demonstrated normal gallbladder and 3 mm size common bile duct.  LFTs are normal.  HIDA scan demonstrated an ejection fraction of 30% and clearly reproduced her symptoms.  Clinically suspect she has bili dyskinesia she wishes to proceed with cholecystectomy.    Findings: Normal anatomy in Calot's triangle confirmed with ICG firefly.  1 adhesions up to the gallbladder may be congenital otherwise gallbladder was normal.    Complications: None    Procedure: The patient was brought to the operating room where she was placed under general endotracheal anesthesia without any difficulty.  She received ICG approximately an hour prior to coming to the operating room.  She had SCD's in place, received Ancef IV antibiotics preoperatively.  The abdomen was prepped and draped in normal  sterile fashion.  Appropriate timeout was taken; everyone was in agreement.  A 5 mm VisiPort was used over Monroe's point left upper quadrant to gain access to the abdomen without any real difficulty.  Once we were in the abdomen adequate pneumoperitoneum was obtained.  TAP abdominal wall block was performed on the right side with a total of 20 mL of 0.5% Marcaine.  We used the laparoscope for guidance and TAP block was performed.  We then put trocar number 1 in the right lower quadrant; this was an 8 mm robotic trocar.  Trocar number 2 was placed  just to the right of the umbilicus off the midline.  Then we moved the camera to trocar number 1 and checked for any injury to the bowel and contents over in the left upper quadrant, which there was none.  The trocar was in good position.  We placed trocar number 3 then to the left of midline just above the umbilicus and this was an 8 mm trocar.  We then removed the 5 mm VisiPort and replaced that with another 8 mm trocar, and this was trocar number 4.  Patient was positioned laparoscopically and then the robot was targeted and docked without any difficulty.  The permanent grasper was placed then in trocar number 4.  The hook was placed in trocar number 3, camera in trocar number 2, and then the fenestrated grasper in trocar number 1.    Instruments were placed at the appropriate sites.  We had good exposure of the gallbladder.  The gallbladder was grasped and pulled anteriorly and superiorly with the grasper, and then using gentle blunt dissection and some use of the hook and cautery we removed the adhesions from the gallbladder, and the gallbladder was further retracted anteriorly and superiorly.  We opened the peritoneum on the anterior and posterior aspects of Calot's triangle using the hook.  What turned out to be a small cystic artery was identified and divided with the hook and cautery.  We were able to get around the neck of the gallbladder junction with the  cystic duct and confirmed our anatomy with the ICG Firefly technology.  We were able to get a good critical view of safety.  Two clips were then placed proximally on the cystic duct and 1 clip distally, and then the cystic duct was divided leaving 2 clips on the cystic duct.  We then used the scissors with cautery to remove the gallbladder from the bed of the liver.  We did not get into the gallbladder as we did this.  Once the gallbladder was freed up the clips were in good position, good hemostasis was noted.  We then placed the gallbladder into the Endo Catch bag for trocar number 3.  The gallbladder was removed without any real difficulty.  Instruments were removed.  Trocars were removed.  The skin was closed at all sites with 4-0 Monocryl subcuticular stitch.  Glue was used for final skin closure at all sites. The patient was awakened, extubated, and transferred to the recovery room in awake and stable condition.                Disposition: Transfer to recovery in stable condition        Jhon Orlando MD     Date: 5/18/2023  Time: 09:01 CD

## 2023-05-18 NOTE — ANESTHESIA POSTPROCEDURE EVALUATION
"Patient: Kathy Urban    Procedure Summary     Date: 05/18/23 Room / Location: [facility] OR 09 / [facility] OR    Anesthesia Start: 0704 Anesthesia Stop: 0907    Procedure: CHOLECYSTECTOMY LAPAROSCOPIC WITH DAVINCI ROBOT (Abdomen) Diagnosis:       Cholecystitis      (Cholecystitis [K81.9])    Surgeons: Jhon Orlando MD Provider: Ciro Nagel CRNA    Anesthesia Type: general ASA Status: 3          Anesthesia Type: general    Vitals  No vitals data found for the desired time range.          Post Anesthesia Care and Evaluation    Patient location during evaluation: PACU  Patient participation: complete - patient cannot participate  Level of consciousness: sleepy but conscious  Pain score: 0  Pain management: adequate    Airway patency: patent  Anesthetic complications: No anesthetic complications  PONV Status: none  Cardiovascular status: stable, acceptable and hemodynamically stable  Respiratory status: acceptable, unassisted and face mask  Hydration status: acceptable    Comments: /69 (BP Location: Right arm, Patient Position: Lying)   Pulse 112   Temp 99 °F (37.2 °C) (Temporal)   Resp 20   Ht 152.4 cm (60\")   Wt 114 kg (251 lb 12.3 oz)   SpO2 94%   BMI 49.17 kg/m²         "

## 2023-05-18 NOTE — INTERVAL H&P NOTE
H&P reviewed. The patient was examined and there are no changes to the H&P.  LFTS  were normal .    Temp:  [96.6 °F (35.9 °C)-99 °F (37.2 °C)] 99 °F (37.2 °C)  Heart Rate:  [112-128] 112  Resp:  [20] 20  BP: (111-122)/(69-78) 111/69

## 2023-05-22 ENCOUNTER — OFFICE VISIT (OUTPATIENT)
Dept: SURGERY | Facility: CLINIC | Age: 18
End: 2023-05-22
Payer: COMMERCIAL

## 2023-05-22 VITALS
BODY MASS INDEX: 48.88 KG/M2 | HEART RATE: 118 BPM | OXYGEN SATURATION: 95 % | WEIGHT: 249 LBS | DIASTOLIC BLOOD PRESSURE: 70 MMHG | SYSTOLIC BLOOD PRESSURE: 100 MMHG | TEMPERATURE: 96.6 F | HEIGHT: 60 IN

## 2023-05-22 DIAGNOSIS — K81.9 CHOLECYSTITIS: Primary | ICD-10-CM

## 2023-05-22 LAB — REF LAB TEST METHOD: NORMAL

## 2023-05-22 PROCEDURE — 99024 POSTOP FOLLOW-UP VISIT: CPT | Performed by: SURGERY

## 2023-05-22 PROCEDURE — 1159F MED LIST DOCD IN RCRD: CPT | Performed by: SURGERY

## 2023-05-22 PROCEDURE — 1160F RVW MEDS BY RX/DR IN RCRD: CPT | Performed by: SURGERY

## 2023-05-22 NOTE — PROGRESS NOTES
17-year-old female who is 1 week out now from robotic cholecystectomy for chronic cholecystitis.  I went over her pathology with her and answered all of her questions.  Patient is done well with the surgery.  She feels better than she did before surgery.  She is tolerating regular diet and having bowel function.  No fever no chills.  She is not icteric her incisions are all clean healing nicely her abdomen is soft.  She will follow-up with us on a as needed basis

## 2023-05-26 LAB
QT INTERVAL: 300 MS
QTC INTERVAL: 409 MS

## 2023-08-23 ENCOUNTER — OFFICE VISIT (OUTPATIENT)
Dept: FAMILY MEDICINE CLINIC | Facility: CLINIC | Age: 18
End: 2023-08-23
Payer: COMMERCIAL

## 2023-08-23 VITALS
BODY MASS INDEX: 50.26 KG/M2 | HEIGHT: 60 IN | OXYGEN SATURATION: 97 % | SYSTOLIC BLOOD PRESSURE: 116 MMHG | HEART RATE: 99 BPM | TEMPERATURE: 97.5 F | DIASTOLIC BLOOD PRESSURE: 70 MMHG | WEIGHT: 256 LBS

## 2023-08-23 DIAGNOSIS — E16.2 LOW BLOOD SUGAR: ICD-10-CM

## 2023-08-23 DIAGNOSIS — E66.01 CLASS 3 SEVERE OBESITY DUE TO EXCESS CALORIES WITHOUT SERIOUS COMORBIDITY WITH BODY MASS INDEX (BMI) OF 50.0 TO 59.9 IN ADULT: Chronic | ICD-10-CM

## 2023-08-23 DIAGNOSIS — L25.5 CONTACT DERMATITIS DUE TO PLANTS, EXCEPT FOOD, UNSPECIFIED CONTACT DERMATITIS TYPE: Primary | ICD-10-CM

## 2023-08-23 RX ORDER — METHYLPREDNISOLONE 4 MG/1
TABLET ORAL
Qty: 1 EACH | Refills: 0 | Status: SHIPPED | OUTPATIENT
Start: 2023-08-23

## 2023-08-23 NOTE — PROGRESS NOTES
"Chief Complaint  Poison Ivy and Diabetes (Blood sugar dropping at work)    Subjective        Kathy Urban presents to [facility]  Poison Ivy    Diabetes  Patient here today with complaints of possible poison ivy on right hand x1 week.  She states that she was in her yard but has not been really pulling weeds, unsure how she could have caught this.  She reports of burning painful itching between her third and fourth digits and some on her fifth digit as well.  She has used IV rest and calamine lotion with minimal relief of symptoms.  Also reports that she feels like her blood sugar has been dropping at work.  She has been going long hours without eating at least 4-5 at a time when she is at work and at that time she will feel weak shaky have to sit down and rest and after she eats that she feels better.  POC glucose on 5/18/2023 was 120 according to her chart.    Objective   Vital Signs:  /70 (BP Location: Left arm, Patient Position: Sitting, Cuff Size: Adult)   Pulse 99 Comment: regular  Temp 97.5 øF (36.4 øC) (Tympanic)   Ht 152.4 cm (60\")   Wt 116 kg (256 lb)   SpO2 97%   BMI 50.00 kg/mý   Estimated body mass index is 50 kg/mý as calculated from the following:    Height as of this encounter: 152.4 cm (60\").    Weight as of this encounter: 116 kg (256 lb).  >99 %ile (Z= 3.13) based on CDC (Girls, 2-20 Years) BMI-for-age based on BMI available as of 8/23/2023.    Physical Exam  Vitals and nursing note reviewed.   Constitutional:       General: She is not in acute distress.     Appearance: Normal appearance. She is obese. She is not ill-appearing, toxic-appearing or diaphoretic.   HENT:      Head: Normocephalic and atraumatic.   Cardiovascular:      Rate and Rhythm: Normal rate and regular rhythm.      Heart sounds: Normal heart sounds. No murmur heard.    No friction rub. No gallop.   Pulmonary:      Effort: Pulmonary effort is normal. No respiratory " distress.      Breath sounds: Normal breath sounds. No stridor. No wheezing, rhonchi or rales.   Abdominal:      Comments: Abd rounded with obesity    Skin:     General: Skin is warm and dry.      Findings: Erythema and rash present. Rash is macular, papular, urticarial and vesicular.      Comments: Erythematous, itching rash noted between third and fourth as well as between 4th and 5th digits on R hand   Neurological:      Mental Status: She is alert and oriented to person, place, and time.      Cranial Nerves: No cranial nerve deficit.      Motor: No weakness.      Coordination: Coordination normal.      Gait: Gait normal.   Psychiatric:         Mood and Affect: Mood normal.         Behavior: Behavior normal.         Thought Content: Thought content normal.         Judgment: Judgment normal.      Result Review :  The following data was reviewed by: AVIS Grant on 08/23/2023:  CMP          2/27/2023    08:38 5/17/2023    15:06   CMP   Glucose 97  158    BUN 11  13    Creatinine 0.65  0.75    EGFR 130.3  117.8    Sodium 137  136    Potassium 3.9  3.9    Chloride 104  102    Calcium 9.0  9.1    Total Protein 7.8     Albumin 4.1     Globulin 3.7     Total Bilirubin 0.5     Alkaline Phosphatase 53     AST (SGOT) 33     ALT (SGPT) 42     Albumin/Globulin Ratio 1.1     BUN/Creatinine Ratio 16.9  17.3    Anion Gap 7.0  13.0          Lipid Panel          2/27/2023    08:38   Lipid Panel   Total Cholesterol 189    Triglycerides 247    HDL Cholesterol 44    VLDL Cholesterol 42    LDL Cholesterol  103    LDL/HDL Ratio 2.17                 Assessment and Plan   Diagnoses and all orders for this visit:    1. Contact dermatitis due to plants, except food, unspecified contact dermatitis type (Primary)  Comments:  R hand  Orders:  -     methylPREDNISolone (MEDROL) 4 MG dose pack; Take as directed on package instructions.  Dispense: 1 each; Refill: 0  -     triamcinolone (KENALOG) 0.1 % ointment; Apply 1 application   topically to the appropriate area as directed 2 (Two) Times a Day.  Dispense: 30 g; Refill: 0    2. Low blood sugar    3. Class 3 severe obesity due to excess calories without serious comorbidity with body mass index (BMI) of 50.0 to 59.9 in adult    Patient advised to follow diabetic diet, low-carb diet low sugar diet and eat frequent small meals throughout the day.  If this does not resolve her hypoglycemic episodes and she needs to return for follow-up and may need repeat labs at that time.  I will also prescribe her triamcinolone ointment and Medrol Dosepak as above with instruction on how to take.  She is advised on potential side effects of medication as well.  If her symptoms should persist or worsen she will return for follow-up otherwise I will see her back as scheduled for chronic conditions.  She is advised not to use the steroid cream on her face or private areas.  All questions and concerns addressed with understanding verbalized.  Patient aware and in agreement to this plan.     I spent 25 minutes caring for Kathy on this date of service. This time includes time spent by me in the following activities:preparing for the visit, reviewing tests, performing a medically appropriate examination and/or evaluation , counseling and educating the patient/family/caregiver, ordering medications, tests, or procedures, and documenting information in the medical record  Follow Up   Return if symptoms worsen or fail to improve, for Recheck, or sooner as needed.  Patient was given instructions and counseling regarding her condition or for health maintenance advice. Please see specific information pulled into the AVS if appropriate.

## (undated) DEVICE — TOWEL,OR,DSP,ST,BLUE,DLX,XR,4/PK,20PK/CS: Brand: MEDLINE

## (undated) DEVICE — TIP COVER ACCESSORY

## (undated) DEVICE — LAPAROVUE VISIBILITY SYSTEM LAPAROSCOPIC SOLUTIONS: Brand: LAPAROVUE

## (undated) DEVICE — PATIENT RETURN ELECTRODE, SINGLE-USE, CONTACT QUALITY MONITORING, ADULT, WITH 9FT CORD, FOR PATIENTS WEIGING OVER 33LBS. (15KG): Brand: MEGADYNE

## (undated) DEVICE — TROCAR: Brand: KII FIOS FIRST ENTRY

## (undated) DEVICE — BLADELESS OBTURATOR: Brand: WECK VISTA

## (undated) DEVICE — GLV SURG SENSICARE PI ORTHO PF SZ7 LF STRL

## (undated) DEVICE — TRENDELENBURG WINGPAD POSITIONING KIT DELUXE - WITHOUT BODY STRAP: Brand: SOULE MEDICAL

## (undated) DEVICE — GLV SURG SIGNATURE ESSENTIAL PF LTX SZ7

## (undated) DEVICE — COLUMN DRAPE

## (undated) DEVICE — TISSUE RETRIEVAL SYSTEM: Brand: INZII RETRIEVAL SYSTEM

## (undated) DEVICE — PK LAP CHOLE LF 60

## (undated) DEVICE — INTENDED FOR TISSUE SEPARATION, AND OTHER PROCEDURES THAT REQUIRE A SHARP SURGICAL BLADE TO PUNCTURE OR CUT.: Brand: BARD-PARKER ® CARBON RIB-BACK BLADES

## (undated) DEVICE — STERILE POLYISOPRENE POWDER-FREE SURGICAL GLOVES WITH EMOLLIENT COATING: Brand: PROTEXIS

## (undated) DEVICE — SUT MONOCRYL 4/0 PS2 27IN Y426H ETY426H

## (undated) DEVICE — ARM DRAPE

## (undated) DEVICE — GLV SURG SIGNATURE ESSENTIAL PF LTX SZ6.5

## (undated) DEVICE — ADHS SKIN PREMIERPRO EXOFIN TOPICAL HI/VISC .5ML

## (undated) DEVICE — DBD-DRAPE,LAP,CHOLE,W/TROUGHS,STERILE: Brand: MEDLINE

## (undated) DEVICE — CANNULA SEAL